# Patient Record
Sex: MALE | Race: WHITE | HISPANIC OR LATINO | Employment: OTHER | URBAN - METROPOLITAN AREA
[De-identification: names, ages, dates, MRNs, and addresses within clinical notes are randomized per-mention and may not be internally consistent; named-entity substitution may affect disease eponyms.]

---

## 2019-02-19 ENCOUNTER — HOSPITAL ENCOUNTER (OUTPATIENT)
Dept: LAB | Facility: HOSPITAL | Age: 84
Discharge: HOME | End: 2019-02-19
Attending: INTERNAL MEDICINE | Admitting: INTERNAL MEDICINE

## 2019-02-19 LAB
ANION GAP SERPL CALC-SCNC: 13 MMOL/L (ref 10–20)
BUN SERPL-MCNC: 41 MG/DL (ref 8–20)
BUN/CREAT SERPL: 22.8 (ref 6.2–20.3)
CALCIUM SERPL-MCNC: 8.8 MG/DL (ref 8.9–10.3)
CHLORIDE SERPL-SCNC: 101 MMOL/L (ref 101–111)
CONV CO2: 27 MMOL/L (ref 22–32)
CREAT UR-MCNC: 1.8 MG/DL (ref 0.7–1.2)
GLUCOSE SERPL-MCNC: 127 MG/DL (ref 65–99)
POTASSIUM SERPL-SCNC: 4 MMOL/L (ref 3.6–5.1)
SODIUM SERPL-SCNC: 137 MMOL/L (ref 136–144)

## 2019-05-28 ENCOUNTER — TELEPHONE (OUTPATIENT)
Dept: CARDIOLOGY | Facility: CLINIC | Age: 84
End: 2019-05-28

## 2019-05-28 RX ORDER — ATORVASTATIN CALCIUM 20 MG/1
20 TABLET, FILM COATED ORAL DAILY
COMMUNITY

## 2019-05-28 RX ORDER — CARVEDILOL 3.12 MG/1
3.12 TABLET ORAL 2 TIMES DAILY WITH MEALS
COMMUNITY

## 2019-05-28 RX ORDER — NITROGLYCERIN 0.4 MG/1
0.4 TABLET SUBLINGUAL
COMMUNITY

## 2019-05-28 RX ORDER — PRAMIPEXOLE DIHYDROCHLORIDE 0.12 MG/1
0.12 TABLET ORAL 3 TIMES DAILY
COMMUNITY

## 2019-05-28 RX ORDER — FAMOTIDINE 20 MG/1
20 TABLET, FILM COATED ORAL 2 TIMES DAILY
COMMUNITY

## 2019-05-28 RX ORDER — CLOPIDOGREL BISULFATE 75 MG/1
75 TABLET ORAL DAILY
COMMUNITY

## 2019-05-28 RX ORDER — TRAZODONE HYDROCHLORIDE 100 MG/1
100 TABLET ORAL NIGHTLY
COMMUNITY

## 2019-05-28 RX ORDER — FINASTERIDE 5 MG/1
5 TABLET, FILM COATED ORAL DAILY
COMMUNITY

## 2019-05-28 RX ORDER — BUSPIRONE HYDROCHLORIDE 15 MG/1
15 TABLET ORAL 3 TIMES DAILY
COMMUNITY

## 2019-05-28 RX ORDER — VENLAFAXINE HYDROCHLORIDE 150 MG/1
150 CAPSULE, EXTENDED RELEASE ORAL DAILY
COMMUNITY

## 2019-05-28 RX ORDER — TAMSULOSIN HYDROCHLORIDE 0.4 MG/1
1 CAPSULE ORAL NIGHTLY
COMMUNITY

## 2019-05-28 RX ORDER — DIGOXIN 125 MCG
125 TABLET ORAL
COMMUNITY

## 2019-05-28 RX ORDER — BUMETANIDE 1 MG/1
1 TABLET ORAL DAILY
COMMUNITY

## 2019-05-29 ENCOUNTER — DOCUMENTATION (OUTPATIENT)
Dept: CARDIOLOGY | Facility: CLINIC | Age: 84
End: 2019-05-29

## 2019-05-29 DIAGNOSIS — I25.5 ISCHEMIC CARDIOMYOPATHY: ICD-10-CM

## 2019-05-29 DIAGNOSIS — I48.0 PAROXYSMAL ATRIAL FIBRILLATION (HCC): ICD-10-CM

## 2019-05-29 DIAGNOSIS — E78.00 PURE HYPERCHOLESTEROLEMIA: ICD-10-CM

## 2019-05-29 DIAGNOSIS — I34.0 NON-RHEUMATIC MITRAL REGURGITATION: ICD-10-CM

## 2019-05-29 DIAGNOSIS — I25.83 CORONARY ARTERY DISEASE DUE TO LIPID RICH PLAQUE: Primary | ICD-10-CM

## 2019-05-29 DIAGNOSIS — I25.10 CORONARY ARTERY DISEASE DUE TO LIPID RICH PLAQUE: Primary | ICD-10-CM

## 2019-05-29 NOTE — PROGRESS NOTES
"Vitals:  Weight: 170 lbs  Height: 5'  BP: 92/60  HR: 87 bpm  Resp: 22  O2: 91 (room air, at rest)    Subjective:     Encounter Date:05/29/2019      Patient ID: Hemant Calhoun is a 84 y.o. male.    Chief Complaint:  Shortness of breath      HPI:  Hemant is a 83-year-old male patient of mine with a history of coronary artery disease status post coronary artery bypass grafting in the past with a 5 vessel bypass performed in 2006 with subsequent stent placement in 2008 who was initially self-referred to me for \"\" leaky valve and worsening exertional dyspnea with rest dyspnea.    Hemant has a history of dyslipidemia, hypertension and paroxysmal atrial fibrillation/tachycardia-bradycardia syndrome (status post permanent pacemaker placed in 3/2018 upgraded to a biV ICD by Dr. Phillip Rosado in 2018 [[employer]]), COPD (supplemental O2 nasal cannula), and obstructive sleep apnea.  He was evaluated for his dyspnea and found to have severe mitral regurgitation with an ejection fraction of approximately 30 to 35%.  In 12/21/2017 at [facility], his echo showed an ejection fraction of 35 to 40% with lateral wall akinesis moderate RV systolic dysfunction and severe mitral regurgitation.  He was evaluated by myself and Dr. Pinzon (cardiothoracic surgery) and determined to be a poor surgical candidate and a better candidate for Mitraclip mitral valve repair. Patient underwent successful mitral clip procedure.  His mitral regurgitation was reduced from severe to mild with a single clip    Patient had recurrent atrial fibrillation and therefore was placed on Eliquis for anticoagulation.  His LV systolic function remains at 30 to 35% on optimize medical therapy in the form of a beta-blocker (without ACE inhibitor due to CKD class IV) and spironolactone.  Following Mitraclip, the patient had a significant increase in his [facility] quality of life and improvement in his [place] to 300 m.    Hemant presents today with a hazy " sensorium uncertain of where he is or the day of the week.  He is wheelchair-bound and unsteady on his feet.  He has worsening shortness of breath.  He has worsening lower extremity edema.  Interrogation of his ICD revealed new atrial flutter with 100% dependency on pacemaker.    The following portions of the patient's history were reviewed and updated as appropriate: He  has a past medical history of Atrial fibrillation (CMS/HCC), CAD (coronary artery disease), Cardiomyopathy (CMS/HCC), COPD (chronic obstructive pulmonary disease) (CMS/HCC), Heart murmur, History of echocardiogram (06/28/2018), Hyperlipidemia, Hypertension, Mitral regurgitation, and ANNABELLA (obstructive sleep apnea)..    Problem List:  Patient Active Problem List   Diagnosis   • ANNABELLA (obstructive sleep apnea)   • CAD (coronary artery disease)   • Atrial fibrillation (CMS/HCC)   • Mitral regurgitation   • Cardiomyopathy (CMS/HCC)   • COPD (chronic obstructive pulmonary disease) (CMS/HCC)   • Heart murmur   • Hyperlipidemia   • Hypertension       Past Medical History:  Past Medical History:   Diagnosis Date   • Atrial fibrillation (CMS/HCC)    • CAD (coronary artery disease)    • Cardiomyopathy (CMS/HCC)    • COPD (chronic obstructive pulmonary disease) (CMS/HCC)    • Heart murmur    • History of echocardiogram 06/28/2018    Moderate LVE, Moderate LV Hypokinesis with akinetic apex, LVEF 30%, Mild MR with mitraclip in place at A2-P2, iatrogenic ASD with mild left to right shunt   • Hyperlipidemia    • Hypertension    • Mitral regurgitation    • ANNABELLA (obstructive sleep apnea)        Past Surgical History:  Past Surgical History:   Procedure Laterality Date   • CORONARY ARTERY BYPASS GRAFT  2006    5V   • MITRAL VALVE REPAIR/REPLACEMENT  06/27/2018    [facility]-Dr. Vega   • PACEMAKER IMPLANTATION      [employer]       Social History:  Social History     Socioeconomic History   • Marital status:      Spouse name: Not on file   • Number of children:  Not on file   • Years of education: Not on file   • Highest education level: Not on file   Tobacco Use   • Smoking status: Former Smoker   • Smokeless tobacco: Never Used   Substance and Sexual Activity   • Alcohol use: No     Frequency: Never   • Drug use: No       Allergies:  Allergies no known allergies      ROS:  Review of Symptoms:  Constitutional: Patient afebrile no chills or unexpected weight changes  Respiratory: +cough, + wheezing + dyspnea  Cardiovascular: No chest pain, palpitations, + dyspnea, +orthopnea and bilateral pitting pretibial edema  Gastrointestinal: No nausea, vomiting, constipation or diarrhea.  No melena or dark stools    All other systems reviewed and are negative       Objective:         There were no vitals taken for this visit.    Physical exam  Constitutional: well-nourished, and appears stated age in no acute distress  PERRL: Conjunctiva clear, no pallor, anicteric  HENMT: normocephalic, normal dentition, no cyanosis or pallor  Neck:no bruits, or thrills and bilateral normal carotid upstroke. Normal jugular venous pressure  Cardiovascular: No parasternal heaves an non-displaced focal PMI. Normal rate and rhythm: no rub, gallop, +II/VI Systmurmur no click and normal S1 and S2.   Lungs: unlabored, +wheezing with bilateral rales in the lower lung fields on auscultation.  Extremities: Warm, no clubbing, cyanosis, or edema. Full and equal peripheral pulses in extremities with no bruits appreciated.   Abdomen: soft, non-tender, non-distended  Musculoskeletal: no joint tenderness or swelling and no erythema  Skin: Warm and dry, non-erythematous   Neuro:alert abnormal affect, twitchy. disriented to time, place        In-Office Procedure(s):  Procedures    ASCVD RIsk Score::  The ASCVD Risk score ([place] TIESHA Jr., et al., 2013) failed to calculate for the following reasons:    The 2013 ASCVD risk score is only valid for ages 40 to 79    Recent Radiology:  Imaging Results (most recent)     None           Lab Review:   No results found for any previous visit.              Invalid input(s): ALKPO4                        Invalid input(s): LDLCALC                Assessment:          Diagnosis Plan   1. Coronary artery disease due to lipid rich plaque     2. Paroxysmal atrial fibrillation (CMS/HCC)     3. Non-rheumatic mitral regurgitation     4. Ischemic cardiomyopathy     5. Pure hypercholesterolemia            Plan:         1. Coronary artery disease due to lipid rich plaque  Clinically stable status post recent stenting.  Continue medical therapy for secondary prevention of ischemic heart disease.    2. Paroxysmal atrial fibrillation (CMS/HCC)  Currently in atrial flutter with slow ventricular response per interrogation of ICD.    3. Non-rheumatic mitral regurgitation  Clinically silent post Mitraclip mitral valve repair    4. Ischemic cardiomyopathy  I personally reviewed his most recent echo from 6/28/2018 which showed moderate left ventricular enlargement with moderate left ventricular hypokinesis with akinetic apex with an ejection fraction of 30%, mild mitral regurgitation and a clip in place at A2 P2.  There is an iatrogenic ASD with mild left-to-right shunting.    Will admit the patient to the hospital for acute on chronic decompensated congestive heart failure (due to LV systolic dysfunction and atrial dysrhythmia).  We will likely have to once again attempt an atrial flutter ablation.    5. Pure hypercholesterolemia  Continue current medical therapy      Level of Care:                 Salvatore Vega MD  05/29/19  .

## 2019-05-30 RX ORDER — AMIODARONE HYDROCHLORIDE 200 MG/1
200 TABLET ORAL DAILY
COMMUNITY

## 2019-05-30 RX ORDER — ASPIRIN 81 MG/1
81 TABLET, CHEWABLE ORAL DAILY
COMMUNITY

## 2019-05-30 RX ORDER — SPIRONOLACTONE 25 MG/1
25 TABLET ORAL DAILY
COMMUNITY